# Patient Record
Sex: MALE | Race: WHITE | NOT HISPANIC OR LATINO
[De-identification: names, ages, dates, MRNs, and addresses within clinical notes are randomized per-mention and may not be internally consistent; named-entity substitution may affect disease eponyms.]

---

## 2018-06-27 PROBLEM — Z00.00 ENCOUNTER FOR PREVENTIVE HEALTH EXAMINATION: Status: ACTIVE | Noted: 2018-06-27

## 2021-01-21 ENCOUNTER — APPOINTMENT (OUTPATIENT)
Dept: NEUROLOGY | Facility: CLINIC | Age: 31
End: 2021-01-21
Payer: COMMERCIAL

## 2021-01-21 ENCOUNTER — TRANSCRIPTION ENCOUNTER (OUTPATIENT)
Age: 31
End: 2021-01-21

## 2021-01-21 VITALS
BODY MASS INDEX: 22.82 KG/M2 | HEART RATE: 95 BPM | SYSTOLIC BLOOD PRESSURE: 125 MMHG | HEIGHT: 65 IN | DIASTOLIC BLOOD PRESSURE: 78 MMHG | WEIGHT: 137 LBS | TEMPERATURE: 98.1 F

## 2021-01-21 DIAGNOSIS — Z82.0 FAMILY HISTORY OF EPILEPSY AND OTHER DISEASES OF THE NERVOUS SYSTEM: ICD-10-CM

## 2021-01-21 DIAGNOSIS — Z87.898 PERSONAL HISTORY OF OTHER SPECIFIED CONDITIONS: ICD-10-CM

## 2021-01-21 DIAGNOSIS — R41.3 OTHER AMNESIA: ICD-10-CM

## 2021-01-21 DIAGNOSIS — F44.89 OTHER DISSOCIATIVE AND CONVERSION DISORDERS: ICD-10-CM

## 2021-01-21 DIAGNOSIS — F90.9 ATTENTION-DEFICIT HYPERACTIVITY DISORDER, UNSPECIFIED TYPE: ICD-10-CM

## 2021-01-21 PROCEDURE — 99072 ADDL SUPL MATRL&STAF TM PHE: CPT

## 2021-01-21 PROCEDURE — 99214 OFFICE O/P EST MOD 30 MIN: CPT

## 2021-01-21 NOTE — REVIEW OF SYSTEMS
[Confused or Disoriented] : confusion [Memory Lapses or Loss] : memory loss [Difficulty with Language] : ~M difficulty with language [Difficulties in Speech] : speech difficulties [Negative] : Heme/Lymph

## 2021-01-22 PROBLEM — R41.3 MEMORY LOSS: Status: ACTIVE | Noted: 2021-01-21

## 2021-01-22 PROBLEM — F90.9 ADHD: Status: ACTIVE | Noted: 2021-01-21

## 2021-01-22 PROBLEM — Z87.898 HISTORY OF DRUG USE: Status: ACTIVE | Noted: 2021-01-21

## 2021-01-22 PROBLEM — Z82.0 FAMILY HISTORY OF PARKINSONISM: Status: ACTIVE | Noted: 2021-01-21

## 2021-01-22 RX ORDER — ZOLPIDEM TARTRATE 5 MG/1
TABLET, FILM COATED ORAL
Refills: 0 | Status: ACTIVE | COMMUNITY

## 2021-01-22 RX ORDER — LEVOTHYROXINE SODIUM 0.05 MG/1
50 TABLET ORAL
Refills: 0 | Status: ACTIVE | COMMUNITY

## 2021-01-22 RX ORDER — METHYLPHENIDATE HYDROCHLORIDE 54 MG/1
54 TABLET, EXTENDED RELEASE ORAL
Refills: 0 | Status: ACTIVE | COMMUNITY

## 2021-01-22 RX ORDER — METHYLPHENIDATE HYDROCHLORIDE 10 MG/1
10 TABLET ORAL
Refills: 0 | Status: ACTIVE | COMMUNITY

## 2021-01-22 RX ORDER — MULTIVITAMIN/IRON/FOLIC ACID 18MG-0.4MG
TABLET ORAL
Refills: 0 | Status: ACTIVE | COMMUNITY

## 2021-01-23 NOTE — ASSESSMENT
[FreeTextEntry1] : Routine and 48 hour ambulatory EEG \par Follow-up with endocrinology regarding thyroid function abnormalities - possible hypothyroidism may be contributing \par \par No driving for at least 6 months \par \par Return to clinic in three months or sooner if continued erratic behavior, seizure activity \par

## 2021-01-23 NOTE — CONSULT LETTER
[Dear  ___] : Dear  [unfilled], [Courtesy Letter:] : I had the pleasure of seeing your patient, [unfilled], in my office today. [Please see my note below.] : Please see my note below. [Sincerely,] : Sincerely, [FreeTextEntry3] : Lori Frazier MD \par Please don't hesitate to call with questions: [phone]  [DrTiffanie  ___] : Dr. [facility]

## 2021-01-23 NOTE — DATA REVIEWED
[de-identified] : 1/14/2021: routine EEG: normal  [de-identified] : 1/14/21: MRI brain: no evidence of acute infarct, edema, hemorrhage or mass. No pituitary or hypothalamic lesion. No mesial temporal scerlosis. No abnormal intracranial enhancing lesion.  [de-identified] : 1/14/21: Free T4:0.8, TSH 9.610 high , prolactin 39 high --> 23.2 high , cortisol PM sample 12.2 high

## 2021-01-23 NOTE — DISCUSSION/SUMMARY
[FreeTextEntry1] : Benigno is a 30-year-old man with history of OCD, ADHD with recent diagnosis of viral thyroiditis in November, on levothyroxine presenting with episodes of slurred speech, change in taste, confusion and erratic behaviors with dizziness. Came to ED three separate times with little to no memory of the events. \par \par No evidence of toxic ingestion. Was  prescribed Ambien, but hadn't been taking it. Tox screen was negative. For second episode, unknown if he ingested prozac and ambien. Routine EEG negative, prolactin elevated for one episode and MRI brain w/wo contrast negative. Cortisol and thyroid abnormalities on labs. Prolactin mildly elevated. \par \par Will need to rule out focal seizures although events are not stereotyped, making it less likely unless he had focal seizures which were followed by post-ictal agitation and confusion. Will need to follow-up on thyroid abnormalities to make sure these are not contributing to overall picture. \par \par If continued bizarre or erratic behavior without alternative explanation, or if EEG suggestive of epileptiform activity, may need to also consider lumbar puncture to rule out autoimmune/inflammatory process given history of tic bite and viral thyroiditis. Anxiety is possible although this is diagnosis of exclusion.

## 2021-01-23 NOTE — HISTORY OF PRESENT ILLNESS
[FreeTextEntry1] : Benigno is a 30-year-old right-handed man with history of anxiety, ADHD, OCD who presents for hospital follow-up after episodes of erratic and confused behavior. \par \par He presented to [facility] ED on 1/14/21. .  As per wife, he hadn't slept for two days. He was excited about projects he was working on. His wife noted that he was slurring his speech, walking into things and spilled food on the floor. The event lasted a couple hours. He remembers that the pasta he ate tasted bitter. He has no memory of going to the emergency room. Prolactin was elevated at 39. Drug screen was negative, serum alcohol negative. He had a routine EEG which was normal. He had an MRI which was normal.There was suspicion for seizure with recommendation to follow-up with neurology and primary care. Lyme serology was sent which was normal, RSV and SARS-CoV2 PCR negative. Influenza was negative. \par \par He returned to ED on 1/16/21. At this time he again had a bitter taste to food and balance difficulties. His lips turned white.  No confusion.. No sign of infection. He had repeat labs, prolactin normal.\par \par He returned once more to [facility] ED on 1/16/20. At this time, his wife reports that his eyes appeared glassy. He was wobbling, unable to walk straight. His wife noted a bottle of ambien was open with pills spilled out. He denied taking any ambien. He was aggressive and took a glass and smashed it. He was banging on the doors and wife found a bunch of Prozac pills which were opened. He denied ingestion and reports no memory of opening the bottles. Labs: were significant for low Free T4 .80 and high TSH 9.610, cortisol was also high at 12.2 .  \par \par Of note, around Thanksgiving he had a tic bite (picture of red erythematous lesion on arm). He had a fever of 1010 and was diagnosed soon after with viral thyroiditis. No rash. Investigation for tic borne illness negative. Was on doxycycline. He had weight loss and palpitations at that time. He is being followed by endocrinology and is on synthroid.  \par \par Past Medical History\par viral thyroiditis\par OCD \par ADHD \par anxiety/depression\par \par Surgical History\par tonsillar \par adenoids\par rhinoplasty  \par baby pyloric stenosis\par hernia\par wisdom teeth extraction\par \par Social History\par Lives with wife and 3 month old baby  \par , works from home \par no alcohol, no drugs \par \par Current Medications\par levothyroxine 50 mcg daily \par concerta 50 mg \par methylphenidate 10 mg daily  \par ambien - 5 mg \par \par Psychiatrist \par Dr. Cathryn Diallo \par \par Seizure risk factors: \par Met developmental milestones\par No family history of epilepsy \par no history of febrile seizure \par no family history of epilepsy \par no history of loss of consciousness\par History of ADHD \par \par Father- hld \par mother - thyroid nodules, hypothyroidism\par sister- hypothyroidism  \par

## 2021-01-23 NOTE — REASON FOR VISIT
[Consultation] : a consultation visit [Spouse] : spouse [FreeTextEntry1] : Hospital Follow up, Possible Seizure, Memory loss, Agitation, Sleep Deprived, Slurred Speech, Confusion

## 2021-01-23 NOTE — PHYSICAL EXAM
[FreeTextEntry1] : Mental status: \par AxOx3. Anxious affect. Speech fluent. Able to do serial sevens, calculate number of quarters in $1.50. \par Able to name. 3/3 registration of 3 items, 3/3 recall at 3 minutes \par CN II: visual acuity 20/20, VFF, blinks to confrontion\par III, IV, VI PERRL, EOMI\par V normal to LT, pinprick, temp, vibration\par VII normal squint versus resistance, normal smile and grimace, face symmetric \par VIII normal hearing \par IX, X normal gag, symmetric palate, uvula midline \par XI normal shrug versus resistance \par XII tongue symmetric \par \par Motor: Full strength throughout \par \par Sensory: normal to light touch, pinprick, temperature \par \par Reflexes 2+ uppers, 3+ patellar , 2+ ankle jerks, no clonus \par toes down \par \par Coordination: no dysmetria on FNF\par Gait: normal balance and gait, no ataxic movements

## 2021-01-27 ENCOUNTER — APPOINTMENT (OUTPATIENT)
Dept: NEUROLOGY | Facility: CLINIC | Age: 31
End: 2021-01-27

## 2021-04-22 ENCOUNTER — APPOINTMENT (OUTPATIENT)
Dept: NEUROLOGY | Facility: CLINIC | Age: 31
End: 2021-04-22